# Patient Record
Sex: FEMALE | Race: WHITE | URBAN - METROPOLITAN AREA
[De-identification: names, ages, dates, MRNs, and addresses within clinical notes are randomized per-mention and may not be internally consistent; named-entity substitution may affect disease eponyms.]

---

## 2018-10-25 ENCOUNTER — HOSPITAL ENCOUNTER (OUTPATIENT)
Dept: LAB | Age: 23
Discharge: HOME | End: 2018-10-25

## 2018-10-25 LAB
RUBV IGG SER-IMP: NORMAL
T-SPOT TB TEST (EMPLOYEE),XTBE: NORMAL

## 2018-10-25 PROCEDURE — 86762 RUBELLA ANTIBODY: CPT | Performed by: EMERGENCY MEDICINE

## 2018-10-25 PROCEDURE — 86765 RUBEOLA ANTIBODY: CPT | Performed by: EMERGENCY MEDICINE

## 2018-10-25 PROCEDURE — 36415 COLL VENOUS BLD VENIPUNCTURE: CPT | Performed by: EMERGENCY MEDICINE

## 2018-10-25 PROCEDURE — 86787 VARICELLA-ZOSTER ANTIBODY: CPT | Performed by: EMERGENCY MEDICINE

## 2018-10-25 PROCEDURE — 86706 HEP B SURFACE ANTIBODY: CPT | Performed by: EMERGENCY MEDICINE

## 2018-10-25 PROCEDURE — 86735 MUMPS ANTIBODY: CPT | Performed by: EMERGENCY MEDICINE

## 2018-10-26 LAB
HBV SURFACE AB SER QL IA: NEGATIVE
HBV SURFACE AB SERPL IA-ACNC: 5.79 MIU/ML
HEP BS AB COMMENT,HBSAC: ABNORMAL
MEV IGG SER IA-ACNC: >300 AU/ML
MUV IGG SER IA-ACNC: 16.2 AU/ML
VZV IGG SER IA-ACNC: 1026 INDEX

## 2020-02-11 ENCOUNTER — HOSPITAL ENCOUNTER (OUTPATIENT)
Dept: LAB | Age: 25
Discharge: HOME | End: 2020-02-11
Payer: COMMERCIAL

## 2020-02-11 ENCOUNTER — OFFICE VISIT (OUTPATIENT)
Dept: FAMILY MEDICINE CLINIC | Age: 25
End: 2020-02-11

## 2020-02-11 VITALS
WEIGHT: 139.2 LBS | BODY MASS INDEX: 23.19 KG/M2 | TEMPERATURE: 98.4 F | HEART RATE: 70 BPM | SYSTOLIC BLOOD PRESSURE: 112 MMHG | RESPIRATION RATE: 14 BRPM | HEIGHT: 65 IN | OXYGEN SATURATION: 100 % | DIASTOLIC BLOOD PRESSURE: 75 MMHG

## 2020-02-11 DIAGNOSIS — N94.6 DYSMENORRHEA: ICD-10-CM

## 2020-02-11 DIAGNOSIS — F32.A DEPRESSION, UNSPECIFIED DEPRESSION TYPE: Primary | ICD-10-CM

## 2020-02-11 DIAGNOSIS — N92.6 IRREGULAR MENSES: ICD-10-CM

## 2020-02-11 DIAGNOSIS — R30.0 DYSURIA: ICD-10-CM

## 2020-02-11 LAB
APPEARANCE UR: CLEAR
BILIRUB UR QL: NEGATIVE
COLOR UR: YELLOW
GLUCOSE UR STRIP.AUTO-MCNC: NEGATIVE MG/DL
HGB UR QL STRIP: NEGATIVE
KETONES UR QL STRIP.AUTO: NEGATIVE MG/DL
LEUKOCYTE ESTERASE UR QL STRIP.AUTO: NEGATIVE
NITRITE UR QL STRIP.AUTO: NEGATIVE
PH UR STRIP: 7 [PH] (ref 5–8)
PROT UR STRIP-MCNC: NEGATIVE MG/DL
SP GR UR REFRACTOMETRY: 1.02 (ref 1–1.03)
UROBILINOGEN UR QL STRIP.AUTO: 0.2 EU/DL (ref 0.2–1)

## 2020-02-11 PROCEDURE — 81003 URINALYSIS AUTO W/O SCOPE: CPT

## 2020-02-11 PROCEDURE — 87086 URINE CULTURE/COLONY COUNT: CPT

## 2020-02-11 RX ORDER — BUPROPION HYDROCHLORIDE 150 MG/1
150 TABLET ORAL
COMMUNITY
End: 2020-02-11 | Stop reason: SDUPTHER

## 2020-02-11 RX ORDER — BUPROPION HYDROCHLORIDE 150 MG/1
150 TABLET ORAL
Qty: 90 TAB | Refills: 1 | Status: SHIPPED | OUTPATIENT
Start: 2020-02-11 | End: 2020-03-24 | Stop reason: SDUPTHER

## 2020-02-11 NOTE — PROGRESS NOTES
Keren Blackburn presents today for   Chief Complaint   Patient presents with   [address] [facility] [address] Depression       Is someone accompanying this pt? no    Is the patient using any DME equipment during OV? no    Depression Screening:  3 most recent PHQ Screens 2/11/2020   Little interest or pleasure in doing things Not at all   Feeling down, depressed, irritable, or hopeless Not at all   Total Score PHQ 2 0       Learning Assessment:  Learning Assessment 2/11/2020   PRIMARY LEARNER Patient   PRIMARY LANGUAGE ENGLISH   LEARNER PREFERENCE PRIMARY DEMONSTRATION     READING     VIDEOS     LISTENING     PICTURES   ANSWERED BY patient   RELATIONSHIP SELF       Abuse Screening:  No flowsheet data found. Fall Risk  No flowsheet data found. Health Maintenance reviewed and discussed and ordered per Provider. Health Maintenance Due   Topic Date Due    HPV Age 9Y-34Y (1 - Female 2-dose series) 01/10/2005    DTaP/Tdap/Td series (1 - Tdap) 01/10/2005    PAP AKA CERVICAL CYTOLOGY  01/10/2015   Patient states she had pap smear in 2018 with Dr. Shane Azar in [place] (phone [phone]). I will have patient sign MIKO to get records. Pt currently taking Antiplatelet therapy? no    Coordination of Care:  1. Have you been to the ER, urgent care clinic since your last visit? Hospitalized since your last visit? no    2. Have you seen or consulted any other health care providers outside of the [address] [place] [address] since your last visit? Include any pap smears or colon screening.  no

## 2020-02-11 NOTE — PROGRESS NOTES
Mark Benton     Chief Complaint   Patient presents with    Establish Care    Depression     Vitals:    02/11/20 0856   BP: 112/75   Pulse: 70   Resp: 14   Temp: 98.4 °F (36.9 °C)   TempSrc: Oral   SpO2: 100%   Weight: 139 lb 3.2 oz (63.1 kg)   Height: 5' 5\" (1.651 m)   PainSc:   0 - No pain   LMP: 01/15/2020         HPI: Tomasa Cedillo  Is here to establish care ,she moved from [place] last year, she need medications for Wellbutrin she has been taking it for 1 year for depression, her depression is a stable no suicidal no homicidal ideation no history hospital admissions  She was following up with nurse practitioner Partha Pena at [facility] records to be requested. Patient is due for annual physical examination and Pap smear she will get that done next visit      Patient has been complaining about chronic dysuria, she thinks it is urinary tract infection, was not treated for it in the past, she denies any vaginal discharge. No flank pain no fever no chills, she stated once she had seen some blood in her urine    Past Medical History:   Diagnosis Date    Depression      Past Surgical History:   Procedure Laterality Date    HX BREAST LUMPECTOMY Right 2015    benign     Social History     Tobacco Use    Smoking status: Never Smoker    Smokeless tobacco: Never Used   Substance Use Topics    Alcohol use: Yes     Comment: socially       Family History   Problem Relation Age of Onset    No Known Problems Mother     No Known Problems Father        Review of Systems   Constitutional: Negative for chills, fever, malaise/fatigue and weight loss. HENT: Negative for congestion, ear discharge, ear pain, hearing loss, nosebleeds, sinus pain and sore throat. Eyes: Negative for blurred vision, double vision and discharge. Respiratory: Negative for cough, hemoptysis, sputum production, shortness of breath and wheezing.     Cardiovascular: Negative for chest pain, palpitations, claudication and leg swelling. Gastrointestinal: Negative for abdominal pain, blood in stool, constipation, diarrhea, nausea and vomiting. Genitourinary: Positive for dysuria and hematuria. Negative for frequency and urgency. Musculoskeletal: Negative for back pain, joint pain, myalgias and neck pain. Skin: Negative for itching and rash. Neurological: Negative for dizziness, tingling, sensory change, speech change, focal weakness, seizures, loss of consciousness, weakness and headaches. Psychiatric/Behavioral: Negative for depression, hallucinations, substance abuse and suicidal ideas. The patient is not nervous/anxious and does not have insomnia. Physical Exam  Vitals signs and nursing note reviewed. Constitutional:       General: She is not in acute distress. Appearance: She is well-developed. She is not diaphoretic. HENT:      Head: Normocephalic and atraumatic. Mouth/Throat:      Pharynx: No oropharyngeal exudate. Eyes:      General: No scleral icterus. Right eye: No discharge. Left eye: No discharge. Conjunctiva/sclera: Conjunctivae normal.      Pupils: Pupils are equal, round, and reactive to light. Neck:      Thyroid: No thyromegaly. Cardiovascular:      Rate and Rhythm: Normal rate and regular rhythm. Heart sounds: Normal heart sounds. No murmur. Pulmonary:      Effort: Pulmonary effort is normal. No respiratory distress. Breath sounds: Normal breath sounds. No wheezing or rales. Chest:      Chest wall: No tenderness. Abdominal:      General: There is no distension. Palpations: Abdomen is soft. Tenderness: There is no abdominal tenderness. There is no rebound. Musculoskeletal: Normal range of motion. General: No tenderness or deformity. Lymphadenopathy:      Cervical: No cervical adenopathy. Skin:     General: Skin is warm and dry. Coloration: Skin is not pale. Findings: No erythema or rash.    Neurological:      Mental Status: She is alert and oriented to person, place, and time. Cranial Nerves: No cranial nerve deficit. Coordination: Coordination normal.   Psychiatric:         Behavior: Behavior normal.         Thought Content: Thought content normal.         Judgment: Judgment normal.          Assessment and plan     Plan of care has been discussed with the patient, he agrees to the plan and verbalized understanding. All his questions were answered  More than 50% of the time spent in this visit was counseling the patient about  illness and treatment options         1. Depression, unspecified depression type  Stable on current medications  - buPROPion XL (WELLBUTRIN XL) 150 mg tablet; Take 1 Tab by mouth every morning. Dispense: 90 Tab; Refill: 1    2. Dysmenorrhea  Her. Is regular normal oral contraceptives and no dysmenorrhea  - levonorgest-eth.estradioL-iron 0.1 mg-0.02 mg (21)/36.5 mg(7) tab; Take 1 Tab by mouth daily. Dispense: 90 Tab; Refill: 0    3. Irregular menses    - levonorgest-eth.estradioL-iron 0.1 mg-0.02 mg (21)/36.5 mg(7) tab; Take 1 Tab by mouth daily. Dispense: 90 Tab; Refill: 0    4. Dysuria    - URINALYSIS W/ RFLX MICROSCOPIC; Future  - CULTURE, URINE; Future    Current Outpatient Medications   Medication Sig Dispense Refill    levonorgest-eth.estradioL-iron 0.1 mg-0.02 mg (21)/36.5 mg(7) tab Take 1 Tab by mouth daily. 90 Tab 0    buPROPion XL (WELLBUTRIN XL) 150 mg tablet Take 1 Tab by mouth every morning. 90 Tab 1       There are no active problems to display for this patient. Results for orders placed or performed during the hospital encounter of 10/25/18   HEP B SURFACE AB   Result Value Ref Range    Hepatitis B surface Ab 5.79 (L) >10.0 mIU/mL    Hep B surface Ab Interp. NEGATIVE  (A) POS      Hep B surface Ab comment        Samples with a  value of 10 mIU/mL or greater are considered positive (protective immunity) in accordance with the CDC guidelines.    MUMPS AB, IGG   Result Value Ref Range    Mumps Abs, IgG 16.2 Immune >10.9 AU/mL   RUBELLA AB, IGG   Result Value Ref Range    Rubella IgG, QL IMMUNE IMM     RUBEOLA AB, IGG   Result Value Ref Range    Rubeola Ab, IgG >300.0 Immune >29.9 AU/mL   VZV AB, IGG   Result Value Ref Range    V. zoster, IgG 1,026 Immune >165 index   T-SPOT TB TEST(EMPLOYEE)   Result Value Ref Range    T-Spot TB Test-Employee SENT TO [facility] FOR TESTING       No visits with results within 3 Month(s) from this visit. Latest known visit with results is:   Hospital Outpatient Visit on 10/25/2018   Component Date Value Ref Range Status    Hepatitis B surface Ab 10/25/2018 5.79* >10.0 mIU/mL Final    Hep B surface Ab Interp. 10/25/2018 NEGATIVE * POS   Final    Hep B surface Ab comment 10/25/2018 Samples with a  value of 10 mIU/mL or greater are considered positive (protective immunity) in accordance with the CDC guidelines. Final    Mumps Abs, IgG 10/25/2018 16.2  Immune >10.9 AU/mL Final    Comment: (NOTE)                                 Negative         <9.0                                 Equivocal  9.0 - 10.9                                 Positive        >10.9  A positive result generally indicates past exposure to  Mumps virus or previous vaccination. Performed At: [facility]  [place] [address] [id number]  Ting Larsen MD [id number]:[id number]      Rubella IgG, QL 10/25/2018 IMMUNE  IMM   Final    Rubeola Ab, IgG 10/25/2018 >300.0  Immune >29.9 AU/mL Final    Comment: (NOTE)                                  Negative        <25.0                                  Equivocal 25.0 - 29.9                                  Positive        >29.9  Presence of antibodies to Rubeola is presumptive evidence  of immunity except when acute infection is suspected.   Performed At: [facility]  [place] [address] [id number]  Ting Larsen MD [id number]      V. zoster, IgG 10/25/2018 1,026  Immune >165 index Final    Comment: (NOTE)                                Negative          <135                                Equivocal    135 - 165                                Positive          >165  A positive result generally indicates exposure to the  pathogen or administration of specific immunoglobulins,  but it is not indication of active infection or stage  of disease. Performed At: [address] [id number]  Calli Mahajan MD [id number]:[id number]      T-Spot TB Test-Employee 10/25/2018 SENT TO [facility] FOR TESTING    Final          Follow-up and Dispositions    · Return for for annual physical, for well women exam  first avialable .

## 2020-02-13 LAB
BACTERIA SPEC CULT: NORMAL
SERVICE CMNT-IMP: NORMAL

## 2020-03-12 ENCOUNTER — EMPLOYEE WELLNESS (OUTPATIENT)
Dept: OTHER | Facility: CLINIC | Age: 25
End: 2020-03-12

## 2020-03-15 LAB
CHOLEST SERPL-MCNC: 176 MG/DL
COTININE SERPL-MCNC: ABNORMAL NG/ML
GLUCOSE SERPL-MCNC: 83 MG/DL (ref 74–99)
HDLC SERPL-MCNC: 68 MG/DL (ref 40–60)
LDLC SERPL CALC-MCNC: 94 MG/DL (ref 0–100)
NICOTINE SERPL-MCNC: ABNORMAL NG/ML
TRIGL SERPL-MCNC: 70 MG/DL (ref ?–150)

## 2020-03-24 DIAGNOSIS — F32.A DEPRESSION, UNSPECIFIED DEPRESSION TYPE: ICD-10-CM

## 2020-03-24 RX ORDER — BUPROPION HYDROCHLORIDE 150 MG/1
150 TABLET ORAL
Qty: 90 TAB | Refills: 1 | Status: SHIPPED | OUTPATIENT
Start: 2020-03-24 | End: 2021-09-28 | Stop reason: ALTCHOICE

## 2020-03-24 NOTE — TELEPHONE ENCOUNTER
Future Appointments   Date Time Provider Dulce Maria Sanchez   5/14/2020  7:00 AM Vince Davison MD [facility]

## 2020-04-08 ENCOUNTER — TELEPHONE (OUTPATIENT)
Dept: FAMILY MEDICINE CLINIC | Age: 25
End: 2020-04-08

## 2020-04-08 NOTE — TELEPHONE ENCOUNTER
Mathew sent over a drug request for patients Balcoltra 0.1/20 tablets.  Preferred medications are Aviane, Kleber Yi, vieliseova, Riazinfe, Taytnoellea

## 2020-04-09 NOTE — TELEPHONE ENCOUNTER
Patient stated that she already have the medication. Her insurance already paid for it once but next time she will get mail order.

## 2020-12-26 ENCOUNTER — NURSE TRIAGE (OUTPATIENT)
Dept: OTHER | Facility: CLINIC | Age: 25
End: 2020-12-26

## 2020-12-26 NOTE — TELEPHONE ENCOUNTER
Reason for Disposition   [1] SEVERE pain with urination  (e.g., excruciating) AND [2] not improved after 2 hours of pain medicine and Sitz bath    Answer Assessment - Initial Assessment Questions  1. SEVERITY: \"How bad is the pain? \"  (e.g., Scale 1-10; mild, moderate, or severe)    - MILD (1-3): complains slightly about urination hurting    - MODERATE (4-7): interferes with normal activities      - SEVERE (8-10): excruciating, unwilling or unable to urinate because of the pain     Moderate    2. FREQUENCY: \"How many times have you had painful urination today? \"   Every 5 mins    3. PATTERN: \"Is pain present every time you urinate or just sometimes? \"    yes    4. ONSET: \"When did the painful urination start? \"    yesterday    5. FEVER: \"Do you have a fever? \" If so, ask: \"What is your temperature, how was it measured, and when did it start? \"  No    6. PAST UTI: \"Have you had a urine infection before? \" If so, ask: \"When was the last time? \" and \"What happened that time? \"   Yes, 1 year ago, got antibiotics    7. CAUSE: \"What do you think is causing the painful urination? \"  (e.g., UTI, scratch, Herpes sore)  UTI    8. OTHER SYMPTOMS: \"Do you have any other symptoms? \" (e.g., flank pain, vaginal discharge, genital sores, urgency, blood in urine)  None    9. PREGNANCY: \"Is there any chance you are pregnant? \" \"When was your last menstrual period? \"     no    Protocols used: URINATION PAIN [facility]  Care advice provided, caller upset and thought we would send in antibiotic and states no longer needed to stay on phone with writer.

## 2021-09-28 PROBLEM — N39.0 URINARY TRACT INFECTIOUS DISEASE: Status: ACTIVE | Noted: 2021-09-28

## 2022-03-19 PROBLEM — N39.0 URINARY TRACT INFECTIOUS DISEASE: Status: ACTIVE | Noted: 2021-09-28

## 2023-01-31 RX ORDER — LEVONORGESTREL AND ETHINYL ESTRADIOL 0.1-0.02MG
1 KIT ORAL DAILY
COMMUNITY
Start: 2020-02-11